# Patient Record
Sex: MALE
[De-identification: names, ages, dates, MRNs, and addresses within clinical notes are randomized per-mention and may not be internally consistent; named-entity substitution may affect disease eponyms.]

---

## 2023-08-17 ENCOUNTER — HOSPITAL ENCOUNTER
Dept: HOSPITAL 101 - VC | Age: 53
Discharge: HOME | End: 2023-08-17
Payer: COMMERCIAL

## 2023-08-17 DIAGNOSIS — R60.0: Primary | ICD-10-CM

## 2023-08-17 PROCEDURE — G0463 HOSPITAL OUTPT CLINIC VISIT: HCPCS

## 2023-08-17 PROCEDURE — 93970 EXTREMITY STUDY: CPT

## 2023-08-17 NOTE — VEIN_ITS
Patient:     ELIZABETH LEDESMA     Exam Date:     2023 
:     1970          Gender:M     MRN:     [id number] 
Ordering :     MRS. JODI L. SCHWAB .     Admission #:     [id number] 
Family :          Order #:     [id number] 
     CLICK HERE TO VIEW EXAM 
  
RADIOLOGY REPORT 
  
PROCEDURE:     VC EXT VENOUS REFLUX IRAM LMTD 
  
COMPARISON:     None. 
  
INDICATIONS:     I83.813 Pain due to varicose veins of bilateral leg veins 
  
TECHNIQUE:     Duplex imaging of the lower extremity to assess the deep and  
superficial venous system for the presence of deep or superficial venous  
incompetence and to document the location and severity of disease.  The study  
includes evaluation of the great saphenous vein (GSV), anterior accessory  
saphenous vein (AASV) and small saphenous vein (SSV).  Patient scanned in  
reverse Trendelenburg and standing. 
FINDINGS:      
RIGHT LOWER EXTREMITY: 
Saphenofemoral Junction Reflux: Yes 8.7mm 1.3 sec 
GSV:     Diam (mm)      Reflux/ Time (sec) 
Proximal Thigh     6.6     Yes   0.8 
Mid Thigh     2.9     No 
Distal Thigh     2.2     No 
Prox Calf     3.7     Yes     0.6 
Mid Calf     3.4     No    
Saphenopopliteal Junction Reflux: 4.2mm        Yes   1.2 
SSV:            
Proximal Calf     4.6     No    
Mid Calf     4.3     Yes   0.3 
AASV:            
Proximal Thigh     2.7     No    
Mid Thigh     2.4         
Distal Thigh              
Thrombi:     No acute or chronic thrombus visualized      
Compressibility:     Normal       
Flow:     Normal       
Preforator: Dist/med calf 3.8mm with 0.9s reflux.  
Tech Note: Incompetent GSV. Patent varicose vein prox/med calf 2.8mm with 0s  
reflux. Patent varicose vein dist/med thigh 2.3mm with 0s reflux.  
  
  
  
  
LEFT LOWER EXTREMITY:  
Saphenofemoral Junction Reflux: Yes 7.4 mm 2.1 sec 
GSV:     Diam (mm)     Reflux/Time (sec) 
Proximal  Thigh     8.2       Yes     1.1 
Mid   Thigh     3.7     No      
Distal  Thigh     3.7     No       
Prox  Calf     2.7     Yes    0.9  
Mid  Calf     2.6     No     
Saphenopopliteal Junction Relux:     6.7 mm     Yes     1.2 
SSV:           
Proximal  Calf     5.8     Yes  1.1 
Mid  Calf     5.0     No      
AASV:             
Proximal  Thigh     4.4     No       
Mid  Thigh                 
Distal  Thigh                 
Thrombi:     No acute or chronic thrombus visualized      
Compressibility:     Normal       
Flow:     Normal       
: Dist/med calf 0.8mm with 0.8s reflux.  
Tech Note: Incompetent GSV and SSV. Patent varicose vein mid/med thigh 2.8mm  
with 0s reflux. Patent varicose vein mid/med calf 1.8mm with 0s reflux. Patent  
varicose vein thigh 1.7mm with 0s reflux. Patent varicose vein 3.4mm with 0s  
reflux.  
  
  
  
CONCLUSION:  
1. Mild dilation of proximal segment of the great saphenous vein bilaterally  
with mild reflux. 
2. Mildly dilated an incompetent left small saphenous vein. 
3. Mildly dilated and slightly incompetent  vein within right distal  
medial calf. 
4. No significant branch saphenous varicosities. 
  
  
Dictated by: Elvin Kahn M.D. on 2023 at 14:07      
Approved by: Elvin Kahn M.D. on 2023 at 14:30

## 2023-08-17 NOTE — VEIN_ITS
Patient:     ELIZABETH LEDESMA     Exam Date:     2023 
:     1970          Gender:M     MRN:     [id number] 
Ordering :     MRS. JODI L. SCHWAB .     Admission #:     [id number] 
Family :          Order #:     [id number] 
     CLICK HERE TO VIEW EXAM 
  
This report includes an Addendum and supersedes previous reports for this  
exam. 
  
  
  
RADIOLOGY REPORT 
  
PROCEDURE:     [facility]ILITY EST COMPREHENSIVE 
  
[facility] - OFFICE VISIT INITIAL 
  
COMPARISON: None. 
  
PROGRESS NOTES: 
  
[age] year old male who presents with a 3 year history of leg swelling,  
edema, heaviness, skin discoloration. The patient's left leg symptoms are  
worse than the right. There has been a progression of symptoms over time. This  
increases with prolonged standing throughout today. The patient describes an  
improvement with rest and elevation, compression stockings. The patient denies  
any signs and symptoms to suggest arterial ischemia. 
  
The patient describes a family history :  Noncontributory. The patient has  
drinking and smoking history of :  Moderate alcohol consumption; no tobacco  
use. Patient has a past medical history significant for stroke,  
hyperlipidemia, hypertension, peripheral neuropathy, atrial fibrillation.  The  
patient denies a history of deep venous thrombus or pulmonary embolus.  
  
See separate history and physical for medication list.  No prior treatment for  
varicose or spider veins.  Current use of compression stockings. 
  
After review of nurse notes, history and physical exam I discussed at length  
the pathophysiology of venous hypertension and possible treatments, therapies  
and strategies available. We discussed at length the importance of elevating  
the lower extremities above the level of the heart, increased physical  
activity and compression stocking use. 
  
Ultrasound venous reflux study performed today was discussed at length with  
the patient. The report demonstrates mildly incompetent and dilated proximal  
segment of the great saphenous vein bilaterally.  Incompetent and mildly  
dilated left small saphenous vein..   
  
  
PHYSICAL EXAM: 
  
The right leg demonstrates no significant varicosities, no spider veins, no  
ulceration, moderate edema,  moderate skin discoloration.    
  
The left leg demonstrates no significant varicosities, no spider veins, no  
ulceration, moderate edema,  moderate skin discoloration.    
  
Both thighs, legs and feet were symmetrically warm to the touch. Good  
posterior tibial and dorsalis pedis pulses were present bilaterally. 
  
ORDER #: [id number] VEIN/[facility] Facility EST Comprehensive  
IMPRESSION:  
   
1.  Mild venous insufficiency involving the proximal segments of the great   
saphenous vein bilaterally and involving the left small saphenous vein; less   
than expected for degree of ankle edema and distal lower extremity skin   
staining.  
2.  No significant lower extremity varicose veins  
3.  Moderate bilateral lower extremity subcutaneous edema  
4. No  flow significant arterial disease  
5. CEAP:  C4a, AP, AS, FL  
   
   
PLAN:  
1. Continued use of compression stockings  
2. Elevated legs and increased physical activity symptomatic relief  
3. Case will be peer reviewed to consider correlation between the much greater 
  
symptomatology compared to appreciable vein disease and any benefits of   
treatment at this time.  An addendum will be added at that time.  
   
   
Nurse notes, history and physical were reviewed and confirmed, see attached   
forms.  
The nurse was present throughout the physical exam and consultation  
   
   
   
Dictated by: Elvin Kahn M.D. on 2023 at 14:30       
Approved by: Elvin Kahn M.D. on 2023 at 14:39     
   
   
ADDENDUM:   
   
Patient will be referred to the lymphedema clinic for evaluation and possible   
treatment.  
   
Dictated by: Elvin Kahn M.D. on 2023 at 09:18       
Approved by: Elvin Kahn M.D. on 2023 at 09:19

## 2024-09-11 ENCOUNTER — HOSPITAL ENCOUNTER (OUTPATIENT)
Dept: HOSPITAL 101 - OT | Age: 54
LOS: 1 days | Discharge: HOME | End: 2024-09-12
Payer: COMMERCIAL

## 2024-09-11 DIAGNOSIS — I89.0: Primary | ICD-10-CM

## 2024-09-11 PROCEDURE — 97140 MANUAL THERAPY 1/> REGIONS: CPT

## 2024-09-11 PROCEDURE — 97530 THERAPEUTIC ACTIVITIES: CPT

## 2024-09-11 PROCEDURE — 97166 OT EVAL MOD COMPLEX 45 MIN: CPT
